# Patient Record
Sex: MALE | Race: WHITE | NOT HISPANIC OR LATINO | URBAN - METROPOLITAN AREA
[De-identification: names, ages, dates, MRNs, and addresses within clinical notes are randomized per-mention and may not be internally consistent; named-entity substitution may affect disease eponyms.]

---
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
## 2024-06-04 NOTE — PEDIATRIC PRE-OP CHECKLIST ([facility] ONLY) - LAST ATE
Writer contacted  services on behalf of patient, left voicemail for callback.  
16-Sep-2019 23:00
16-Sep-2019 23:00

## 2025-01-17 ENCOUNTER — EMERGENCY (EMERGENCY)
Facility: HOSPITAL | Age: 25
LOS: 1 days | Discharge: HOME | End: 2025-01-17
Attending: EMERGENCY MEDICINE
Payer: COMMERCIAL

## 2025-01-17 VITALS
HEART RATE: 82 BPM | TEMPERATURE: 99 F | OXYGEN SATURATION: 99 % | WEIGHT: 179.9 LBS | SYSTOLIC BLOOD PRESSURE: 146 MMHG | DIASTOLIC BLOOD PRESSURE: 82 MMHG | HEIGHT: 69 IN | RESPIRATION RATE: 20 BRPM

## 2025-01-17 RX ORDER — METHOCARBAMOL 500 MG
1500 TABLET ORAL ONCE
Refills: 0 | Status: COMPLETED | OUTPATIENT
Start: 2025-01-17 | End: 2025-01-17

## 2025-01-17 RX ORDER — ACETAMINOPHEN 80 MG/.8ML
650 SOLUTION/ DROPS ORAL ONCE
Refills: 0 | Status: COMPLETED | OUTPATIENT
Start: 2025-01-17 | End: 2025-01-17

## 2025-01-17 RX ORDER — LIDOCAINE 50 MG/G
1 OINTMENT TOPICAL ONCE
Refills: 0 | Status: COMPLETED | OUTPATIENT
Start: 2025-01-17 | End: 2025-01-17

## 2025-01-17 RX ORDER — METHOCARBAMOL 500 MG
2 TABLET ORAL
Qty: 18 | Refills: 0
Start: 2025-01-17 | End: 2025-01-19

## 2025-01-17 RX ADMIN — LIDOCAINE 1 PATCH: 50 OINTMENT TOPICAL at 18:39

## 2025-01-17 RX ADMIN — Medication 1500 MILLIGRAM(S): at 18:38

## 2025-01-17 RX ADMIN — ACETAMINOPHEN 650 MILLIGRAM(S): 80 SOLUTION/ DROPS ORAL at 18:46

## 2025-01-17 RX ADMIN — ACETAMINOPHEN 650 MILLIGRAM(S): 80 SOLUTION/ DROPS ORAL at 18:39

## 2025-01-17 NOTE — ED PROVIDER NOTE - PATIENT PORTAL LINK FT
You can access the FollowMyHealth Patient Portal offered by [facility] by registering at the following website: [email or web address]. By joining [employer]’s FollowMyHealth portal, you will also be able to view your health information using other applications (apps) compatible with our system.
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
## 2025-01-17 NOTE — ED PROVIDER NOTE - NSFOLLOWUPINSTRUCTIONS_ED_ALL_ED_FT
Fill your prescription and take as directed. Do not drive, drink alcohol or operate heavy/dangerous machinery while taking the medicine. Call to make an appointment to follow up with your primary care provider. Return to ER If you develop shortness of breath, chest pain, excessive vomiting, numbness, weakness or other worsening symptoms.

## 2025-01-17 NOTE — ED PROVIDER NOTE - CLINICAL SUMMARY MEDICAL DECISION MAKING FREE TEXT BOX
24 y/o male, with no significant PMHx, presents with left upper back pain s/p heavy lifting today, ?muscle spasm. Negative midline TTP from cervical to lumbar spine.  +Muscular TTP to left upper back. NVI. Will give pain medication. Reassess.

## 2025-01-17 NOTE — ED PROVIDER NOTE - MUSCULOSKELETAL MINIMAL EXAM
PAST SURGICAL HISTORY:  H/O cardiac radiofrequency ablation      Negative midline TTP from cervical to lumbar spine.  +Muscular TTP to left upper back. NVI.

## 2025-01-17 NOTE — ED PROVIDER NOTE - PROGRESS NOTE DETAILS
Patient reports improvement in symptoms. Patient well appearing, NAD, non-toxic appearing. No further ED workup indicated at this time. Prescription for Robaxin. Advised not to drive, drink alcohol or operate heavy/dangerous machinery while taking the medicine. Supportive care. Follow up with PCP. Return precautions discussed. Patient verbally demonstrated understanding of results and plan. Patient stable for discharge.

## 2025-01-17 NOTE — ED ADULT NURSE NOTE - NSFALLUNIVINTERV_ED_ALL_ED
Bed/Stretcher in lowest position, wheels locked, appropriate side rails in place/Call bell, personal items and telephone in reach/Instruct patient to call for assistance before getting out of bed/chair/stretcher/Non-slip footwear applied when patient is off stretcher/[place] to call system/Physically safe environment - no spills, clutter or unnecessary equipment/Purposeful proactive rounding/Room/bathroom lighting operational, light cord in reach
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
